# Patient Record
Sex: MALE | Race: WHITE | Employment: UNEMPLOYED | URBAN - NONMETROPOLITAN AREA
[De-identification: names, ages, dates, MRNs, and addresses within clinical notes are randomized per-mention and may not be internally consistent; named-entity substitution may affect disease eponyms.]

---

## 2019-01-25 ENCOUNTER — HOSPITAL ENCOUNTER (EMERGENCY)
Age: 39
Discharge: HOME | End: 2019-01-25
Attending: EMERGENCY MEDICINE

## 2019-01-25 VITALS
HEIGHT: 70 IN | DIASTOLIC BLOOD PRESSURE: 85 MMHG | SYSTOLIC BLOOD PRESSURE: 126 MMHG | WEIGHT: 190 LBS | TEMPERATURE: 99 F | RESPIRATION RATE: 12 BRPM | BODY MASS INDEX: 27.2 KG/M2 | OXYGEN SATURATION: 100 % | HEART RATE: 76 BPM

## 2019-01-25 DIAGNOSIS — T14.8XXA ABRASION: ICD-10-CM

## 2019-01-25 DIAGNOSIS — S09.90XA CLOSED HEAD INJURY, INITIAL ENCOUNTER: ICD-10-CM
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
## 2019-01-25 DIAGNOSIS — S39.012A STRAIN OF LUMBAR REGION, INITIAL ENCOUNTER: ICD-10-CM

## 2019-01-25 PROCEDURE — 96372 THER/PROPH/DIAG INJ SC/IM: CPT

## 2019-01-25 PROCEDURE — 6370000000 HC RX 637 (ALT 250 FOR IP): Performed by: EMERGENCY MEDICINE

## 2019-01-25 PROCEDURE — 99283 EMERGENCY DEPT VISIT LOW MDM: CPT

## 2019-01-25 PROCEDURE — 6360000002 HC RX W HCPCS: Performed by: EMERGENCY MEDICINE

## 2019-01-25 RX ORDER — ACETAMINOPHEN 325 MG/1
650 TABLET ORAL EVERY 6 HOURS PRN
Qty: 120 TABLET | Refills: 3 | Status: SHIPPED | OUTPATIENT
Start: 2019-01-25 | End: 2021-06-11

## 2019-01-25 RX ORDER — IBUPROFEN 600 MG/1
600 TABLET ORAL 4 TIMES DAILY PRN
Qty: 360 TABLET | Refills: 1 | Status: SHIPPED | OUTPATIENT
Start: 2019-01-25 | End: 2019-02-26

## 2019-01-25 RX ORDER — KETOROLAC TROMETHAMINE 30 MG/ML
15 INJECTION, SOLUTION INTRAMUSCULAR; INTRAVENOUS ONCE
Status: COMPLETED | OUTPATIENT
Start: 2019-01-25 | End: 2019-01-25

## 2019-01-25 RX ORDER — DIAZEPAM 5 MG/1
5 TABLET ORAL ONCE
Status: COMPLETED | OUTPATIENT
Start: 2019-01-25 | End: 2019-01-25

## 2019-01-25 RX ADMIN — DIAZEPAM 5 MG: 5 TABLET ORAL at 12:12

## 2019-01-25 RX ADMIN — KETOROLAC TROMETHAMINE 15 MG: 30 INJECTION, SOLUTION INTRAMUSCULAR; INTRAVENOUS at 12:11

## 2019-01-25 ASSESSMENT — PAIN SCALES - GENERAL
PAINLEVEL_OUTOF10: 1
PAINLEVEL_OUTOF10: 8

## 2019-01-25 ASSESSMENT — PAIN DESCRIPTION - FREQUENCY: FREQUENCY: CONTINUOUS

## 2019-01-25 ASSESSMENT — PAIN DESCRIPTION - DURATION: DURATION_2: CONTINUOUS

## 2019-01-25 ASSESSMENT — PAIN DESCRIPTION - PAIN TYPE: TYPE: ACUTE PAIN

## 2019-01-25 ASSESSMENT — PAIN DESCRIPTION - DESCRIPTORS
DESCRIPTORS_2: DULL;CONSTANT
DESCRIPTORS: ACHING

## 2019-01-25 ASSESSMENT — PAIN DESCRIPTION - LOCATION
LOCATION: HEAD
LOCATION_2: BACK

## 2019-01-25 ASSESSMENT — PAIN DESCRIPTION - INTENSITY: RATING_2: 8

## 2019-01-25 ASSESSMENT — PAIN DESCRIPTION - ORIENTATION: ORIENTATION_2: LOWER;MID

## 2019-02-26 ENCOUNTER — HOSPITAL ENCOUNTER (EMERGENCY)
Age: 39
Discharge: HOME | End: 2019-02-26
Attending: EMERGENCY MEDICINE

## 2019-02-26 VITALS
HEIGHT: 71 IN | WEIGHT: 190 LBS | DIASTOLIC BLOOD PRESSURE: 79 MMHG | HEART RATE: 76 BPM | RESPIRATION RATE: 16 BRPM | OXYGEN SATURATION: 97 % | BODY MASS INDEX: 26.6 KG/M2 | TEMPERATURE: 97.9 F | SYSTOLIC BLOOD PRESSURE: 124 MMHG

## 2019-02-26 DIAGNOSIS — R19.7 DIARRHEA OF PRESUMED INFECTIOUS ORIGIN: Primary | ICD-10-CM

## 2019-02-26 PROCEDURE — 6370000000 HC RX 637 (ALT 250 FOR IP): Performed by: EMERGENCY MEDICINE

## 2019-02-26 PROCEDURE — 99283 EMERGENCY DEPT VISIT LOW MDM: CPT

## 2019-02-26 RX ORDER — LOPERAMIDE HYDROCHLORIDE 2 MG/1
4 CAPSULE ORAL ONCE
Status: COMPLETED | OUTPATIENT
Start: 2019-02-26 | End: 2019-02-26

## 2019-02-26 RX ADMIN — LOPERAMIDE HYDROCHLORIDE 4 MG: 2 CAPSULE ORAL at 17:35

## 2019-02-26 ASSESSMENT — ENCOUNTER SYMPTOMS
VOMITING: 0
ANAL BLEEDING: 0
ABDOMINAL DISTENTION: 0
CONSTIPATION: 0
RECTAL PAIN: 0
BLOOD IN STOOL: 0
ABDOMINAL PAIN: 0
NAUSEA: 0
DIARRHEA: 1

## 2020-06-12 ENCOUNTER — OFFICE VISIT (OUTPATIENT)
Dept: PRIMARY CARE CLINIC | Age: 40
End: 2020-06-12

## 2020-06-12 ENCOUNTER — HOSPITAL ENCOUNTER (OUTPATIENT)
Age: 40
Setting detail: SPECIMEN
Discharge: HOME | End: 2020-06-12
Payer: OTHER GOVERNMENT

## 2020-06-12 PROCEDURE — U0002 COVID-19 LAB TEST NON-CDC: HCPCS

## 2020-06-12 PROCEDURE — 99212 OFFICE O/P EST SF 10 MIN: CPT | Performed by: FAMILY MEDICINE

## 2020-06-12 NOTE — PROGRESS NOTES
test results is NEGATIVE, you can end your quarantine and return to your normal routine or work. - Please return to clinic or call us if symptoms are worsened. [] Discharge home with written instructions for:  [] Flu management  [x] Possible COVID-19 infection with self-quarantine and management of symptoms  [x] Follow-up with primary care physician or emergency department if worsens  [] Evaluation per physician/nurse practitioner in clinic  [] Sent to ER       An  electronic signature was used to authenticate this note.      --Alysia Keen on 6/12/2020 at 3:52 PM

## 2020-06-12 NOTE — PATIENT INSTRUCTIONS
- Your covid-19 testing result takes 3-4 days to come back. [address] will notify the test result if it is POSITIVE. In case of NEGATIVE result, our office will notify you. You can also view the result through  [facility] [address].   - Stay at home and self quarantine until COVID-19 test result is available. If your COVID-19 test result is POSITIVE, 14 day self quarantine is recommended. If the test results is NEGATIVE, you can end your quarantine and return to your normal routine or work. - Please return to clinic or call us if symptoms are worsened. Walking/Standing

## 2020-06-15 LAB
SARS-COV-2: NOT DETECTED
SOURCE: NORMAL

## 2021-06-11 ENCOUNTER — HOSPITAL ENCOUNTER (EMERGENCY)
Age: 41
Discharge: HOME | End: 2021-06-11
Attending: EMERGENCY MEDICINE

## 2021-06-11 VITALS
HEART RATE: 64 BPM | OXYGEN SATURATION: 99 % | HEIGHT: 71 IN | BODY MASS INDEX: 25.48 KG/M2 | TEMPERATURE: 98.4 F | DIASTOLIC BLOOD PRESSURE: 112 MMHG | WEIGHT: 182 LBS | SYSTOLIC BLOOD PRESSURE: 176 MMHG | RESPIRATION RATE: 21 BRPM

## 2021-06-11 DIAGNOSIS — D69.6 THROMBOCYTOPENIA (HCC): ICD-10-CM

## 2021-06-11 DIAGNOSIS — R80.9 PROTEINURIA, UNSPECIFIED TYPE: ICD-10-CM

## 2021-06-11 DIAGNOSIS — R30.0 DYSURIA: Primary | ICD-10-CM

## 2021-06-11 DIAGNOSIS — R31.9 HEMATURIA, UNSPECIFIED TYPE: ICD-10-CM

## 2021-06-11 LAB
ALBUMIN SERPL-MCNC: 4.6 GM/DL (ref 3.4–5)
ALP BLD-CCNC: 74 IU/L (ref 40–129)
ALT SERPL-CCNC: 62 U/L (ref 10–40)
ANION GAP SERPL CALCULATED.3IONS-SCNC: 11 MMOL/L (ref 4–16)
APTT: 29.4 SECONDS (ref 25.1–37.1)
AST SERPL-CCNC: 66 IU/L (ref 15–37)
BACTERIA: ABNORMAL /HPF
BASOPHILS ABSOLUTE: 0.1 K/CU MM
BASOPHILS RELATIVE PERCENT: 0.7 % (ref 0–1)
BILIRUB SERPL-MCNC: 1.7 MG/DL (ref 0–1)
BILIRUBIN URINE: ABNORMAL MG/DL
BLOOD, URINE: ABNORMAL
BUN BLDV-MCNC: 9 MG/DL (ref 6–23)
CALCIUM SERPL-MCNC: 9.6 MG/DL (ref 8.3–10.6)
CAST TYPE: ABNORMAL /HPF
CHLORIDE BLD-SCNC: 101 MMOL/L (ref 99–110)
CLARITY: ABNORMAL
CO2: 28 MMOL/L (ref 21–32)
COLOR: ABNORMAL
COMMENT UA: ABNORMAL
CREAT SERPL-MCNC: 0.9 MG/DL (ref 0.9–1.3)
CRYSTAL TYPE: NEGATIVE /HPF
DIFFERENTIAL TYPE: ABNORMAL
EOSINOPHILS ABSOLUTE: 0.1 K/CU MM
EOSINOPHILS RELATIVE PERCENT: 0.8 % (ref 0–3)
EPITHELIAL CELLS, UA: 1 /HPF
GFR AFRICAN AMERICAN: >60 ML/MIN/1.73M2
GFR NON-AFRICAN AMERICAN: >60 ML/MIN/1.73M2
GLUCOSE BLD-MCNC: 100 MG/DL (ref 70–99)
GLUCOSE, URINE: NEGATIVE MG/DL
HCT VFR BLD CALC: 55.5 % (ref 42–52)
HEMOGLOBIN: 19.4 GM/DL (ref 13.5–18)
ICTOTEST: NEGATIVE
IMMATURE NEUTROPHIL %: 0.4 % (ref 0–0.43)
INR BLD: 1.03 INDEX
KETONES, URINE: 40 MG/DL
LEUKOCYTE ESTERASE, URINE: NEGATIVE
LYMPHOCYTES ABSOLUTE: 1.4 K/CU MM
LYMPHOCYTES RELATIVE PERCENT: 19.3 % (ref 24–44)
MCH RBC QN AUTO: 33 PG (ref 27–31)
MCHC RBC AUTO-ENTMCNC: 35 % (ref 32–36)
MCV RBC AUTO: 94.4 FL (ref 78–100)
MONOCYTES ABSOLUTE: 0.5 K/CU MM
MONOCYTES RELATIVE PERCENT: 6.4 % (ref 0–4)
NITRITE URINE, QUANTITATIVE: NEGATIVE
PDW BLD-RTO: 12.6 % (ref 11.7–14.9)
PH, URINE: 7.5 (ref 5–8)
PLATELET # BLD: 120 K/CU MM (ref 140–440)
PMV BLD AUTO: 8.2 FL (ref 7.5–11.1)
POTASSIUM SERPL-SCNC: 4.8 MMOL/L (ref 3.5–5.1)
PROTEIN UA: 2000 MG/DL
PROTHROMBIN TIME: 11.7 SECONDS (ref 11.7–14.5)
RBC # BLD: 5.88 M/CU MM (ref 4.6–6.2)
RBC URINE: ABNORMAL /HPF (ref 0–3)
SEGMENTED NEUTROPHILS ABSOLUTE COUNT: 5.3 K/CU MM
SEGMENTED NEUTROPHILS RELATIVE PERCENT: 72.4 % (ref 36–66)
SODIUM BLD-SCNC: 140 MMOL/L (ref 135–145)
SPECIFIC GRAVITY UA: 1.01 (ref 1–1.03)
TOTAL IMMATURE NEUTOROPHIL: 0.03 K/CU MM
TOTAL PROTEIN: 8.1 GM/DL (ref 6.4–8.2)
UROBILINOGEN, URINE: 2 MG/DL (ref 0.2–1)
WBC # BLD: 7.3 K/CU MM (ref 4–10.5)
WBC UA: 2 /HPF (ref 0–2)

## 2021-06-11 PROCEDURE — 87086 URINE CULTURE/COLONY COUNT: CPT

## 2021-06-11 PROCEDURE — 85730 THROMBOPLASTIN TIME PARTIAL: CPT

## 2021-06-11 PROCEDURE — 81001 URINALYSIS AUTO W/SCOPE: CPT

## 2021-06-11 PROCEDURE — 85610 PROTHROMBIN TIME: CPT

## 2021-06-11 PROCEDURE — 87491 CHLMYD TRACH DNA AMP PROBE: CPT

## 2021-06-11 PROCEDURE — 99285 EMERGENCY DEPT VISIT HI MDM: CPT

## 2021-06-11 PROCEDURE — 87591 N.GONORRHOEAE DNA AMP PROB: CPT

## 2021-06-11 PROCEDURE — 80053 COMPREHEN METABOLIC PANEL: CPT

## 2021-06-11 PROCEDURE — 85025 COMPLETE CBC W/AUTO DIFF WBC: CPT

## 2021-06-11 RX ORDER — CEPHALEXIN 500 MG/1
500 CAPSULE ORAL 3 TIMES DAILY
Qty: 21 CAPSULE | Refills: 0 | Status: SHIPPED | OUTPATIENT
Start: 2021-06-11 | End: 2021-06-18

## 2021-06-11 ASSESSMENT — PAIN DESCRIPTION - ORIENTATION: ORIENTATION: LEFT

## 2021-06-11 ASSESSMENT — PAIN SCALES - GENERAL: PAINLEVEL_OUTOF10: 2

## 2021-06-11 ASSESSMENT — PAIN DESCRIPTION - LOCATION: LOCATION: FLANK

## 2021-06-11 ASSESSMENT — PAIN DESCRIPTION - PAIN TYPE: TYPE: ACUTE PAIN

## 2021-06-11 ASSESSMENT — PAIN DESCRIPTION - DESCRIPTORS: DESCRIPTORS: BURNING;SHARP

## 2021-06-11 NOTE — ED TRIAGE NOTES
Pt arrival via walk in. Pt reports burning and blood with urination, denies exposure to STD. Pt also c/o intermittent mild left flank pain, reports has had kidney stones in past. ALert and orientedx4, ambulatory to room, skin pink warm dry, respirations even and unlabored.

## 2021-06-11 NOTE — ED PROVIDER NOTES
EMERGENCY DEPARTMENT ENCOUNTER    Patient: Amber Adhikari  MRN: [id number]  : 1980  Date of Evaluation: 2021  ED Provider:  Mery Esparza MD    CHIEF COMPLAINT  Chief Complaint   Patient presents with    Dysuria       HPI  Amber Adhikari is a 37 y.o. male who presents with complaints of burning with urination as well as blood in the urine her last couple of days. Denies abdominal or back pain. Denies testicular or scrotal pain or swelling or discoloration. Denies any penile or urethral discharge. Denies any penile wounds. Denies history of bleeding disorder or being on anticoagulation. Denies any other associated symptoms or complaints or concerns. Of note, the patient states he is not currently sexually active and has not been sexually active in the last 10 months.       REVIEW OF SYSTEMS    Constitutional: negative for fever, chills  Neurological: negative for HA, focal weakness, loss of sensation  Ophthalmic: negative for vision change  ENT: negative for congestion, rhinorrhea  Cardiovascular: negative for chest pain  Respiratory: negative for SOB, cough  GI: negative for abdominal pain, nausea, vomiting, diarrhea, constipation  : negative for testicular pain or swelling  Musculoskeletal: negative for myalgias, decreased ROM, joint swelling  Dermatological: negative for rash, wounds  Heme: Negative for bleeding, bruising      PAST MEDICAL HISTORY  Past Medical History:   Diagnosis Date    Hypertension        CURRENT MEDICATIONS  [unfilled]    ALLERGIES  No Known Allergies    SURGICAL HISTORY  Past Surgical History:   Procedure Laterality Date    HERNIA REPAIR      HERNIA REPAIR      age 2        FAMILY HISTORY  Family History   Problem Relation Age of Onset    Stroke Paternal Grandfather        SOCIAL HISTORY  Social History     Socioeconomic History    Marital status: Legally      Spouse name: None    Number of children: None    Years of education: None    Highest education level: None   Occupational History    None   Tobacco Use    Smoking status: Current Every Day Smoker     Packs/day: 1.00    Smokeless tobacco: Former User   Vaping Use    Vaping Use: Never used   Substance and Sexual Activity    Alcohol use: Yes     Comment: drinks daily    Drug use: No    Sexual activity: Yes     Partners: Female   Other Topics Concern    None   Social History Narrative    ** Merged History Encounter **          Social Determinants of Health     Financial Resource Strain:     Difficulty of Paying Living Expenses:    Food Insecurity:     Worried About Running Out of Food in the Last Year:     Ran Out of Food in the Last Year:    Transportation Needs:     Lack of Transportation (Medical):  Lack of Transportation (Non-Medical):    Physical Activity:     Days of Exercise per Week:     Minutes of Exercise per Session:    Stress:     Feeling of Stress :    Social Connections:     Frequency of Communication with Friends and Family:     Frequency of Social Gatherings with Friends and Family:     Attends Denominational Services:     Active Member of Clubs or Organizations:     Attends Club or Organization Meetings:     Marital Status:    Intimate Partner Violence:     Fear of Current or Ex-Partner:     Emotionally Abused:     Physically Abused:     Sexually Abused:          **Past medical, family and social histories, and nursing notes reviewed and verified by me**      PHYSICAL EXAM  VITAL SIGNS:   ED Triage Vitals [06/11/21 1621]   Enc Vitals Group      BP (!) 176/115      Pulse 108      Resp 20      Temp 98.4 °F (36.9 °C)      Temp Source Oral      SpO2 97 %      Weight 182 lb (82.6 kg)      Height 5' 11\" (1.803 m)      Head Circumference       Peak Flow       Pain Score       Pain Loc       Pain Edu? Excl. in [address]? Vitals during ED course were reviewed and are as charted.     Constitutional: Minimal distress, Non-toxic appearance  Eyes: Conjunctiva normal, No discharge  HENT: Normocephalic, Atraumatic, bilateral external ears normal, posterior oropharynx is nonerythematous and without exudate, uvula is midline, oropharynx moist  Neck: Supple, no stridor, no grossly visible or palpable masses  Cardiovascular: Regular rate and rhythm, No murmurs, No rubs, No gallops  Pulmonary/Chest: Normal breath sounds, No respiratory distress or accessory muscle use, No wheezing, crackles or rhonchi. Abdomen: Soft, nondistended and nonrigid, No tenderness or peritoneal signs, No masses, normal bowel sounds  Back: No midline point tenderness, No paraspinous muscle tenderness.  No CVA tenderness  Extremities: No gross deformities, no edema, no tenderness  Neurologic: Normal motor function, Normal sensory function, No focal deficits  Skin: Warm, Dry, No erythema, No rash, No cyanosis, No mottling  Lymphatic: No lymphadenopathy in the following location(s): cervical  Psychiatric: Alert and oriented x3, Affect normal        RADIOLOGY/PROCEDURES/LABS/MEDICATIONS ADMINISTERED:    I have reviewed and interpreted all of the currently available lab results from this visit (if applicable):  Results for orders placed or performed during the hospital encounter of 06/11/21   Urinalysis, reflex to microscopic   Result Value Ref Range    Color, UA DARK YELLOW (A) YELLOW    Clarity, UA CLOUDY (A) CLEAR    Glucose, Urine NEGATIVE NEGATIVE MG/DL    Bilirubin Urine MODERATE NUMBER OR AMOUNT OBSERVED (A) NEGATIVE MG/DL    Ketones, Urine 40 (A) NEGATIVE MG/DL    Specific Gravity, UA 1.015 1.001 - 1.035    Blood, Urine LARGE NUMBER OR AMOUNT OBSERVED (A) NEGATIVE    pH, Urine 7.5 5.0 - 8.0    Protein, UA 2000 (HH) NEGATIVE MG/DL    Urobilinogen, Urine 2 (H) 0.2 - 1.0 MG/DL    Nitrite Urine, Quantitative NEGATIVE NEGATIVE    Leukocyte Esterase, Urine NEGATIVE NEGATIVE    RBC, UA TOO NUMEROUS TO COUNT 0 - 3 /HPF    WBC, UA 2 0 - 2 /HPF    Epithelial Cells, UA 1 /HPF    Cast Type NO CAST FORMS SEEN NO CAST FORMS SEEN /HPF    Bacteria, UA MANY (A) NEGATIVE /HPF    Crystal Type NEGATIVE NEGATIVE /HPF    Urinalysis Comments FEW    ICTOTEST, URINE   Result Value Ref Range    Ictotest NEGATIVE    CBC Auto Differential   Result Value Ref Range    WBC 7.3 4.0 - 10.5 K/CU MM    RBC 5.88 4.6 - 6.2 M/CU MM    Hemoglobin 19.4 (H) 13.5 - 18.0 GM/DL    Hematocrit 55.5 (H) 42 - 52 %    MCV 94.4 78 - 100 FL    MCH 33.0 (H) 27 - 31 PG    MCHC 35.0 32.0 - 36.0 %    RDW 12.6 11.7 - 14.9 %    Platelets 236 (L) 349 - 440 K/CU MM    MPV 8.2 7.5 - 11.1 FL    Differential Type AUTOMATED DIFFERENTIAL     Segs Relative 72.4 (H) 36 - 66 %    Lymphocytes % 19.3 (L) 24 - 44 %    Monocytes % 6.4 (H) 0 - 4 %    Eosinophils % 0.8 0 - 3 %    Basophils % 0.7 0 - 1 %    Segs Absolute 5.3 K/CU MM    Lymphocytes Absolute 1.4 K/CU MM    Monocytes Absolute 0.5 K/CU MM    Eosinophils Absolute 0.1 K/CU MM    Basophils Absolute 0.1 K/CU MM    Immature Neutrophil % 0.4 0 - 0.43 %    Total Immature Neutrophil 0.03 K/CU MM   Comprehensive Metabolic Panel w/ Reflex to MG   Result Value Ref Range    Sodium 140 135 - 145 MMOL/L    Potassium 4.8 3.5 - 5.1 MMOL/L    Chloride 101 99 - 110 mMol/L    CO2 28 21 - 32 MMOL/L    BUN 9 6 - 23 MG/DL    CREATININE 0.9 0.9 - 1.3 MG/DL    Glucose 100 (H) 70 - 99 MG/DL    Calcium 9.6 8.3 - 10.6 MG/DL    Albumin 4.6 3.4 - 5.0 GM/DL    Total Protein 8.1 6.4 - 8.2 GM/DL    Total Bilirubin 1.7 (H) 0.0 - 1.0 MG/DL    ALT 62 (H) 10 - 40 U/L    AST 66 (H) 15 - 37 IU/L    Alkaline Phosphatase 74 40 - 129 IU/L    GFR Non-African American >60 >60 mL/min/1.73m2    GFR African American >60 >60 mL/min/1.73m2    Anion Gap 11 4 - 16   Protime-INR   Result Value Ref Range    Protime 11.7 11.7 - 14.5 SECONDS    INR 1.03 INDEX   APTT   Result Value Ref Range    aPTT 29.4 25.1 - 37.1 SECONDS          ABNORMAL LABS:  Labs Reviewed   URINALYSIS - Abnormal; Notable for the following components:       Result Value    Color, UA DARK YELLOW (*)     Clarity, UA CLOUDY (*)     Bilirubin Urine MODERATE NUMBER OR AMOUNT OBSERVED (*)     Ketones, Urine 40 (*)     Blood, Urine LARGE NUMBER OR AMOUNT OBSERVED (*)     Protein, UA 2000 (*)     Urobilinogen, Urine 2 (*)     Bacteria, UA MANY (*)     All other components within normal limits   CBC WITH AUTO DIFFERENTIAL - Abnormal; Notable for the following components:    Hemoglobin 19.4 (*)     Hematocrit 55.5 (*)     MCH 33.0 (*)     Platelets 454 (*)     Segs Relative 72.4 (*)     Lymphocytes % 19.3 (*)     Monocytes % 6.4 (*)     All other components within normal limits   COMPREHENSIVE METABOLIC PANEL W/ REFLEX TO MG FOR LOW K - Abnormal; Notable for the following components:    Glucose 100 (*)     Total Bilirubin 1.7 (*)     ALT 62 (*)     AST 66 (*)     All other components within normal limits   CULTURE, URINE   C.TRACHOMATIS N.GONORRHOEAE DNA   ICTOTEST, URINE   PROTIME-INR   APTT         IMAGING STUDIES ORDERED:  None      No orders to display         MEDICATIONS ADMINISTERED:  Medications - No data to display      COURSE & MEDICAL DECISION MAKING  Last vitals: BP (!) 176/112   Pulse 64   Temp 98.4 °F (36.9 °C) (Oral)   Resp 21   Ht 5' 11\" (1.803 m)   Wt 182 lb (82.6 kg)   SpO2 99%   BMI 25.38 kg/m²     71-year-old male who presents with dysuria and hematuria who is urinalysis evidence for a large amount of hematuria. Not many white blood cells within the urine however there are many bacteria noted. Given this and his symptoms we will send the urine for culture and treat with antibiotics. Patient was also noted to have large amount of protein within the urine as well. Did consider the possibility of nephrotic syndrome. Blood work was obtained and renal function was unremarkable. No evidence for any significant electrolyte abnormalities. No evidence for anemia however he is noted to have mild thrombocytopenia. This is rather mild however but is of unclear cause.   Patient does admit upon questioning to be in a regular alcohol drinker. And drinks a small bottle of vodka every other day. Differential diagnoses considered, included, but were not limited to, appendicitis, diverticulitis, ureterolithiasis, epididymitis, prostatitis, urethritis/cystitis, and interstitial cystitis. Additional workup and treatment in the ED as documented above. Patient reassured and will be discharged home. I have explained to the patient in appropriate terminology our work-up in the ED and their diagnosis. I have also given anticipatory guidance and expectant management of their condition as an outpatient as per my custom. The patient was given clear discharge and follow-up instructions including return to the ER immediately for worsening concerns. The patient has been advised to follow-up with their primary care physician and/or referred physician in the next two to three days or sooner if worsening and to return to the ER immediately as above with any concerns. I provided the patient counseling with regard to my customary list of strict return precautions as well as return precautions specific to the cause for today's emergency department visit. The patient will return under these provided conditions, but should also return for new concerns or further worsening. Pt and/or family understand and agree with plan. Clinical Impression:  1. Dysuria    2. Hematuria, unspecified type    3. Proteinuria, unspecified type    4.  Thrombocytopenia [facility])        Disposition referral (if applicable):  [facility]  [phone]    Call   For urology follow-up    Ross Cuevas DO  [address] [address]  [phone]    Call   For neprhology follow-up    [facility] Emergency Department  [address]  [phone]    If symptoms worsen      Disposition medications (if applicable):  Discharge Medication List as of 6/11/2021  6:08 PM

## 2021-06-12 LAB
CULTURE: NORMAL
Lab: NORMAL
SPECIMEN: NORMAL

## 2021-06-16 LAB
CHLAMYDIA TRACHOMATIS AMPLIFIED DET: NEGATIVE
N GONORRHOEAE AMPLIFIED DET: NEGATIVE

## 2025-05-16 ENCOUNTER — APPOINTMENT (OUTPATIENT)
Dept: GENERAL RADIOLOGY | Age: 45
End: 2025-05-16

## 2025-05-16 ENCOUNTER — HOSPITAL ENCOUNTER (EMERGENCY)
Age: 45
Discharge: HOME | End: 2025-05-16
Attending: EMERGENCY MEDICINE

## 2025-05-16 VITALS
DIASTOLIC BLOOD PRESSURE: 93 MMHG | RESPIRATION RATE: 17 BRPM | OXYGEN SATURATION: 95 % | WEIGHT: 164 LBS | HEART RATE: 105 BPM | BODY MASS INDEX: 22.96 KG/M2 | TEMPERATURE: 98.2 F | HEIGHT: 71 IN | SYSTOLIC BLOOD PRESSURE: 151 MMHG

## 2025-05-16 DIAGNOSIS — R06.89 DYSPNEA AND RESPIRATORY ABNORMALITIES: Primary | ICD-10-CM

## 2025-05-16 DIAGNOSIS — R06.2 WHEEZING: ICD-10-CM

## 2025-05-16 DIAGNOSIS — R06.00 DYSPNEA AND RESPIRATORY ABNORMALITIES: Primary | ICD-10-CM

## 2025-05-16 LAB
ALBUMIN SERPL-MCNC: 4.1 G/DL (ref 3.4–5)
ALBUMIN/GLOB SERPL: 1.4 {RATIO}
ALP SERPL-CCNC: 71 U/L (ref 40–129)
ALT SERPL-CCNC: 27 U/L (ref 10–40)
ANION GAP SERPL CALCULATED.3IONS-SCNC: 17 MMOL/L (ref 9–17)
AST SERPL-CCNC: 23 U/L (ref 15–37)
BASOPHILS # BLD: 0.03 K/UL
BASOPHILS NFR BLD: 0 % (ref 0–1)
BILIRUB SERPL-MCNC: 0.4 MG/DL (ref 0–1)
BNP SERPL-MCNC: <36 PG/ML (ref 0–125)
BUN SERPL-MCNC: 10 MG/DL (ref 7–20)
CALCIUM SERPL-MCNC: 8.5 MG/DL (ref 8.3–10.6)
CHLORIDE SERPL-SCNC: 106 MMOL/L (ref 99–110)
CO2 SERPL-SCNC: 20 MMOL/L (ref 21–32)
CREAT SERPL-MCNC: 0.7 MG/DL (ref 0.9–1.3)
D DIMER PPP FEU-MCNC: <0.27 UG/ML FEU (ref 0–0.46)
EKG ATRIAL RATE: 98 BPM
EKG DIAGNOSIS: NORMAL
EKG P-R INTERVAL: 150 MS
EKG Q-T INTERVAL: 344 MS
EKG QRS DURATION: 80 MS
EKG QTC CALCULATION (BAZETT): 439 MS
EKG R AXIS: 145 DEGREES
EKG T AXIS: 80 DEGREES
EKG VENTRICULAR RATE: 98 BPM
EOSINOPHIL # BLD: 0.02 K/UL
EOSINOPHILS RELATIVE PERCENT: 0 % (ref 0–3)
ERYTHROCYTE [DISTWIDTH] IN BLOOD BY AUTOMATED COUNT: 14.9 % (ref 11.7–14.9)
GFR, ESTIMATED: >90 ML/MIN/1.73M2
GLUCOSE SERPL-MCNC: 119 MG/DL (ref 74–99)
HCO3 VENOUS: 30.9 MMOL/L (ref 22–29)
HCT VFR BLD AUTO: 46.5 % (ref 42–52)
HGB BLD-MCNC: 16.1 G/DL (ref 13.5–18)
IMM GRANULOCYTES # BLD AUTO: 0.02 K/UL
IMM GRANULOCYTES NFR BLD: 0 %
LYMPHOCYTES NFR BLD: 3.03 K/UL
LYMPHOCYTES RELATIVE PERCENT: 45 % (ref 24–44)
MCH RBC QN AUTO: 30.1 PG (ref 27–31)
MCHC RBC AUTO-ENTMCNC: 34.6 G/DL (ref 32–36)
MCV RBC AUTO: 87.1 FL (ref 78–100)
MONOCYTES NFR BLD: 0.31 K/UL
MONOCYTES NFR BLD: 5 % (ref 0–5)
NEUTROPHILS NFR BLD: 50 % (ref 36–66)
NEUTS SEG NFR BLD: 3.4 K/UL
O2 SAT, VEN: 99.5 % (ref 34–95)
PCO2 VENOUS: 34.8 MM HG (ref 41–51)
PH VENOUS: 7.56 (ref 7.32–7.43)
PLATELET # BLD AUTO: 192 K/UL (ref 140–440)
PMV BLD AUTO: 8.4 FL (ref 7.5–11.1)
PO2 VENOUS: 142.4 MM HG (ref 28–48)
POSITIVE BASE EXCESS, VEN: 8.4 MMOL/L (ref 0–3)
POTASSIUM SERPL-SCNC: 3.8 MMOL/L (ref 3.5–5.1)
PROT SERPL-MCNC: 7.1 G/DL (ref 6.4–8.2)
RBC # BLD AUTO: 5.34 M/UL (ref 4.6–6.2)
SODIUM SERPL-SCNC: 142 MMOL/L (ref 136–145)
TCO2 CALC VENOUS: 32 MMOL/L (ref 21–32)
TROPONIN I SERPL HS-MCNC: <6 NG/L (ref 0–22)
WBC OTHER # BLD: 6.8 K/UL (ref 4–10.5)

## 2025-05-16 PROCEDURE — 94640 AIRWAY INHALATION TREATMENT: CPT

## 2025-05-16 PROCEDURE — 85379 FIBRIN DEGRADATION QUANT: CPT

## 2025-05-16 PROCEDURE — 71045 X-RAY EXAM CHEST 1 VIEW: CPT

## 2025-05-16 PROCEDURE — 6370000000 HC RX 637 (ALT 250 FOR IP): Performed by: EMERGENCY MEDICINE

## 2025-05-16 PROCEDURE — 93005 ELECTROCARDIOGRAM TRACING: CPT | Performed by: EMERGENCY MEDICINE

## 2025-05-16 PROCEDURE — 99285 EMERGENCY DEPT VISIT HI MDM: CPT

## 2025-05-16 PROCEDURE — 93010 ELECTROCARDIOGRAM REPORT: CPT | Performed by: INTERNAL MEDICINE

## 2025-05-16 PROCEDURE — 83880 ASSAY OF NATRIURETIC PEPTIDE: CPT

## 2025-05-16 PROCEDURE — 84484 ASSAY OF TROPONIN QUANT: CPT

## 2025-05-16 PROCEDURE — 80053 COMPREHEN METABOLIC PANEL: CPT

## 2025-05-16 PROCEDURE — 85025 COMPLETE CBC W/AUTO DIFF WBC: CPT

## 2025-05-16 PROCEDURE — 82803 BLOOD GASES ANY COMBINATION: CPT

## 2025-05-16 RX ORDER — ALBUTEROL SULFATE 90 UG/1
2 INHALANT RESPIRATORY (INHALATION) EVERY 4 HOURS PRN
Qty: 18 G | Refills: 1 | Status: SHIPPED | OUTPATIENT
Start: 2025-05-16 | End: 2025-06-15

## 2025-05-16 RX ORDER — IPRATROPIUM BROMIDE AND ALBUTEROL SULFATE 2.5; .5 MG/3ML; MG/3ML
1 SOLUTION RESPIRATORY (INHALATION) ONCE
Status: COMPLETED | OUTPATIENT
Start: 2025-05-16 | End: 2025-05-16

## 2025-05-16 RX ORDER — PREDNISONE 10 MG/1
60 TABLET ORAL DAILY
Qty: 30 TABLET | Refills: 0 | Status: SHIPPED | OUTPATIENT
Start: 2025-05-17 | End: 2025-05-22

## 2025-05-16 RX ORDER — ASPIRIN 81 MG/1
81 TABLET, CHEWABLE ORAL DAILY
Qty: 30 TABLET | Refills: 0 | Status: SHIPPED | OUTPATIENT
Start: 2025-05-16

## 2025-05-16 RX ADMIN — IPRATROPIUM BROMIDE AND ALBUTEROL SULFATE 1 DOSE: 2.5; .5 SOLUTION RESPIRATORY (INHALATION) at 16:05

## 2025-05-16 ASSESSMENT — ENCOUNTER SYMPTOMS
GASTROINTESTINAL NEGATIVE: 1
WHEEZING: 1
SHORTNESS OF BREATH: 1
EYES NEGATIVE: 1

## 2025-05-16 ASSESSMENT — PAIN SCALES - GENERAL: PAINLEVEL_OUTOF10: 0

## 2025-05-16 ASSESSMENT — PAIN - FUNCTIONAL ASSESSMENT: PAIN_FUNCTIONAL_ASSESSMENT: 0-10

## 2025-05-16 NOTE — ED PROVIDER NOTES
[facility]      TRIAGE CHIEF COMPLAINT:   Shortness of Breath (X several days, worst with activity )      [place]:  Kae Briscoe is a 44 y.o. male that presents with complaint of shortness of breath.  Patient states for last 3 days he has been having increased shortness of breath.  He has shortness of breath upon exertion.  He has no heart problems or lung problems.  Has been smoking since he is 13 years old.  Does not take medication does not see a doctor.  He has no fevers nausea vomiting no chest pain no abdominal pain no history of DVT, PE, AAA no swelling no abdominal pain no sore throat no congestion no cough no travel sick contacts shortness of breath.  No other questions or concerns.  Does smoke as mention..    REVIEW OF SYSTEMS:  At least 10 systems reviewed and otherwise acutely negative except as in the [place].    Review of Systems   Constitutional: Negative.    HENT: Negative.     Eyes: Negative.    Respiratory:  Positive for shortness of breath and wheezing.    Cardiovascular: Negative.    Gastrointestinal: Negative.    Endocrine: Negative.    Genitourinary: Negative.    Musculoskeletal: Negative.    Skin: Negative.    Neurological:  Positive for light-headedness.   Hematological: Negative.    Psychiatric/Behavioral: Negative.     All other systems reviewed and are negative.      Past Medical History:   Diagnosis Date    Hypertension      Past Surgical History:   Procedure Laterality Date    HERNIA REPAIR      HERNIA REPAIR      age 4      Family History   Problem Relation Age of Onset    Stroke Paternal Grandfather      Social History     Socioeconomic History    Marital status: Legally      Spouse name: Not on file    Number of children: Not on file    Years of education: Not on file    Highest education level: Not on file   Occupational History    Not on file   Tobacco Use    Smoking status: Every Day     Current packs/day: 1.00     Types: Cigarettes    Smokeless  may have been completed with a voice recognition program. Efforts were made to edit the dictations but occasionally words aremis-transcribed.)    DISPOSITION REFERRAL (if applicable):  [facility] Emergency Department  [address] [address] [phone]    If symptoms worsen    Seymour Madrid MD  [address]   [facility] [place] [facility] [address] [phone]    Schedule an appointment as soon as possible for a visit in 1 day        DISPOSITION MEDICATIONS (if applicable):  New Prescriptions    ALBUTEROL SULFATE HFA (PROVENTIL HFA) 108 (90 BASE) MCG/ACT INHALER    Inhale 2 puffs into the lungs every 4 hours as needed for Wheezing or Shortness of Breath With spacer (and mask if indicated). Thanks.    ASPIRIN (ASPIRIN CHILDRENS) 81 MG CHEWABLE TABLET    Take 1 tablet by mouth daily    PREDNISONE (DELTASONE) 10 MG TABLET    Take 6 tablets by mouth daily for 5 days          DO Last Jackson James, DO  05/16/25 5447

## 2025-07-24 ENCOUNTER — OFFICE VISIT (OUTPATIENT)
Dept: PULMONOLOGY | Age: 45
End: 2025-07-24

## 2025-07-24 VITALS
WEIGHT: 162 LBS | DIASTOLIC BLOOD PRESSURE: 82 MMHG | BODY MASS INDEX: 22.68 KG/M2 | HEIGHT: 71 IN | SYSTOLIC BLOOD PRESSURE: 128 MMHG | HEART RATE: 93 BPM | OXYGEN SATURATION: 98 %

## 2025-07-24 DIAGNOSIS — R07.89 FEELING OF CHEST TIGHTNESS: ICD-10-CM

## 2025-07-24 DIAGNOSIS — R06.00 DYSPNEA AND RESPIRATORY ABNORMALITY: Primary | ICD-10-CM

## 2025-07-24 DIAGNOSIS — R06.89 DYSPNEA AND RESPIRATORY ABNORMALITY: Primary | ICD-10-CM

## 2025-07-24 DIAGNOSIS — J44.9 CHRONIC OBSTRUCTIVE PULMONARY DISEASE, UNSPECIFIED COPD TYPE (HCC): ICD-10-CM

## 2025-07-24 DIAGNOSIS — F17.200 SMOKER: ICD-10-CM

## 2025-07-24 DIAGNOSIS — R09.89 CHEST CONGESTION: ICD-10-CM

## 2025-07-24 RX ORDER — IPRATROPIUM BROMIDE AND ALBUTEROL SULFATE 2.5; .5 MG/3ML; MG/3ML
1 SOLUTION RESPIRATORY (INHALATION) EVERY 4 HOURS
Qty: 360 ML | Refills: 3 | Status: SHIPPED | OUTPATIENT
Start: 2025-07-24

## 2025-07-24 RX ORDER — GUAIFENESIN 600 MG/1
600 TABLET, EXTENDED RELEASE ORAL 2 TIMES DAILY
Qty: 30 TABLET | Refills: 0 | Status: SHIPPED | OUTPATIENT
Start: 2025-07-24 | End: 2025-08-08

## 2025-07-24 RX ORDER — ALBUTEROL SULFATE 0.83 MG/ML
2.5 SOLUTION RESPIRATORY (INHALATION) ONCE
Status: COMPLETED | OUTPATIENT
Start: 2025-07-24 | End: 2025-07-24

## 2025-07-24 RX ADMIN — ALBUTEROL SULFATE 2.5 MG: 0.83 SOLUTION RESPIRATORY (INHALATION) at 10:30

## 2025-07-24 ASSESSMENT — ENCOUNTER SYMPTOMS
SHORTNESS OF BREATH: 1
COUGH: 1
CHEST TIGHTNESS: 1

## 2025-07-24 NOTE — PROGRESS NOTES
Kae Briscoe (:  1980) is a 45 y.o. male,New patient, here for evaluation of the following chief complaint(s):  Shortness of Breath (Started mid May, went to the ER. Gave him breathing tx and EKG referred him to pulmonology. Is still smoking 2-3 cigarettes/day. States that he feels tightness in his chest like there's rubber band stretching, it's worse when he's working, feels like he needs to sit down bc he feels weary, gets dizzy)    Subjective   SUBJECTIVE/OBJECTIVE:  Kae Briscoe 44 yo male is here post ED visit for SOB. States that he has been feeling this way for a long time. He was in ED in May and was seen for SOB. Was given albuterol tx and steroids and chest xray and lab blood.     Maternal grandfather had too much heart muscle, hypertropic cardiomegaly . The 10 th heart attack took him. Father side had relative who had strokes. Not much familiar with his father's side.     No known Covid, in child dolan walking pneumonia. He requires albuterol for SOB. Patient states that the medication does help when he needs it. Reports having discomfort in the left chest and middle sternum  when he moves his left arm. Describes a feeling of rubber band that is about to snap and feeling of SOB. Coughing is congestions right now. Patient states that it's been like that for a long time.     Able to lie flat in the bed. Cannot lie on the left side because of discomfort.  He appears as though he has ran a rest and is exhausted. Doesn't lie completely flat,  lexy has an angle. Toss and turns during the night. Wakes up coughing. 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  Having breathing problems worse than other days. Unable to walk. Feeling like he is running half a mile.     He requires a nebulizer to administer Albuterol/Atrovent solution for dx COPD. Harsh scattered wheezing. Breathing treatment administered once wit Albuterol 2.5 mg/3 ml. Patient states relief post treatment. Still wheezing, but as

## 2025-08-18 ENCOUNTER — TELEPHONE (OUTPATIENT)
Dept: CARDIOLOGY CLINIC | Age: 45
End: 2025-08-18